# Patient Record
Sex: MALE | Race: BLACK OR AFRICAN AMERICAN | NOT HISPANIC OR LATINO | URBAN - NONMETROPOLITAN AREA
[De-identification: names, ages, dates, MRNs, and addresses within clinical notes are randomized per-mention and may not be internally consistent; named-entity substitution may affect disease eponyms.]

---

## 2023-08-10 ENCOUNTER — OFFICE VISIT (OUTPATIENT)
Dept: URBAN - NONMETROPOLITAN AREA CLINIC 4 | Facility: CLINIC | Age: 53
End: 2023-08-10

## 2023-11-03 ENCOUNTER — CLAIMS CREATED FROM THE CLAIM WINDOW (OUTPATIENT)
Dept: URBAN - NONMETROPOLITAN AREA CLINIC 4 | Facility: CLINIC | Age: 53
End: 2023-11-03
Payer: OTHER GOVERNMENT

## 2023-11-03 VITALS
SYSTOLIC BLOOD PRESSURE: 117 MMHG | WEIGHT: 186.6 LBS | HEIGHT: 69 IN | BODY MASS INDEX: 27.64 KG/M2 | DIASTOLIC BLOOD PRESSURE: 78 MMHG | TEMPERATURE: 97.9 F | HEART RATE: 65 BPM

## 2023-11-03 DIAGNOSIS — R14.0 BLOATING: ICD-10-CM

## 2023-11-03 DIAGNOSIS — Z12.11 COLON CANCER SCREENING: ICD-10-CM

## 2023-11-03 DIAGNOSIS — R11.0 NAUSEA: ICD-10-CM

## 2023-11-03 DIAGNOSIS — Z86.19 HISTORY OF HELICOBACTER PYLORI INFECTION: ICD-10-CM

## 2023-11-03 PROBLEM — 116289008: Status: ACTIVE | Noted: 2023-11-03

## 2023-11-03 PROBLEM — 422587007: Status: ACTIVE | Noted: 2023-11-03

## 2023-11-03 PROCEDURE — 99244 OFF/OP CNSLTJ NEW/EST MOD 40: CPT | Performed by: PHYSICIAN ASSISTANT

## 2023-11-03 PROCEDURE — 99204 OFFICE O/P NEW MOD 45 MIN: CPT | Performed by: PHYSICIAN ASSISTANT

## 2023-11-03 RX ORDER — PANTOPRAZOLE SODIUM 40 MG/1
1 TABLET TABLET, DELAYED RELEASE ORAL ONCE A DAY
Qty: 30 | OUTPATIENT
Start: 2023-11-03

## 2023-11-03 RX ORDER — ACETAMINOPHEN 325 MG
1 TABLET AS NEEDED TABLET ORAL
Status: ACTIVE | COMMUNITY

## 2023-11-03 NOTE — PHYSICAL EXAM HENT:
Head, normocephalic, atraumatic, Face, Face within normal limits, External ears within normal limits, External nose  normal appearance
No

## 2023-11-03 NOTE — HPI-TODAY'S VISIT:
Cornelio is a 53 year old male with past medical history of seasonal allergies who presents to the office today secondary to early satiety, nausea and intermittent abdominal bloating.  [facility] as well as colonoscopic screening in 2019. His colonoscopy wasn't normal he is due in 2029, and on his EGD although I do not have biopsies he was treated with amoxicillin, clarithromycin and omeprazole therapy for H pylori  he does endorse that he had a follow-up stool test to confirm eradication, however he is now with repeat presenting symptoms similar to the previous symptoms from 2019  he started taking probiotics with some improvement, but still has to stop eating mid meal because of bloating and nausea  he has not been taking any acid reflux medicine  does not smoke, does not drink and does not take excessive anti-inflammatory medications

## 2023-11-07 LAB — H PYLORI BREATH TEST: NOT DETECTED

## 2023-12-15 ENCOUNTER — OFFICE VISIT (OUTPATIENT)
Dept: URBAN - NONMETROPOLITAN AREA CLINIC 4 | Facility: CLINIC | Age: 53
End: 2023-12-15

## 2023-12-20 ENCOUNTER — CLAIMS CREATED FROM THE CLAIM WINDOW (OUTPATIENT)
Dept: URBAN - NONMETROPOLITAN AREA CLINIC 4 | Facility: CLINIC | Age: 53
End: 2023-12-20
Payer: OTHER GOVERNMENT

## 2023-12-20 ENCOUNTER — DASHBOARD ENCOUNTERS (OUTPATIENT)
Age: 53
End: 2023-12-20

## 2023-12-20 VITALS
HEART RATE: 68 BPM | HEIGHT: 69 IN | DIASTOLIC BLOOD PRESSURE: 81 MMHG | SYSTOLIC BLOOD PRESSURE: 129 MMHG | BODY MASS INDEX: 27.7 KG/M2 | WEIGHT: 187 LBS | TEMPERATURE: 97.9 F

## 2023-12-20 DIAGNOSIS — Z86.19 HISTORY OF HELICOBACTER PYLORI INFECTION: ICD-10-CM

## 2023-12-20 DIAGNOSIS — Z12.11 COLON CANCER SCREENING: ICD-10-CM

## 2023-12-20 PROBLEM — 15627741000119108: Status: ACTIVE | Noted: 2023-11-03

## 2023-12-20 PROBLEM — 305058001: Status: ACTIVE | Noted: 2023-11-03

## 2023-12-20 PROCEDURE — 99213 OFFICE O/P EST LOW 20 MIN: CPT | Performed by: PHYSICIAN ASSISTANT

## 2023-12-20 RX ORDER — ACETAMINOPHEN 325 MG
1 TABLET AS NEEDED TABLET ORAL
Status: ACTIVE | COMMUNITY

## 2023-12-20 RX ORDER — PANTOPRAZOLE SODIUM 40 MG/1
1 TABLET TABLET, DELAYED RELEASE ORAL ONCE A DAY
Qty: 30 | OUTPATIENT

## 2023-12-20 RX ORDER — PANTOPRAZOLE SODIUM 40 MG/1
1 TABLET TABLET, DELAYED RELEASE ORAL ONCE A DAY
Qty: 30 | Status: ACTIVE | COMMUNITY
Start: 2023-11-03

## 2023-12-20 NOTE — HPI-TODAY'S VISIT:
Cornelio is a 53 year old male with past medical history of seasonal allergies who presents to the office today secondary to early satiety, nausea and intermittent abdominal bloating.  [facility] in 2019 as well as colonoscopic screening in 2019. His colonoscopy wasn't normal he is due in 2029, and on his EGD although I do not have biopsies he was treated with amoxicillin, clarithromycin and omeprazole therapy for H pylori  he does endorse that he had a follow-up stool test to confirm eradication, however he is now with repeat presenting symptoms similar to the previous symptoms from 2019  he started taking probiotics with some improvement, but still has to stop eating mid meal because of bloating and nausea  he has not been taking any acid reflux medicine  does not smoke, does not drink and does not take excessive anti-inflammatory medications  12.20.23 doing well with no further bloating, HP testing negative  Taking PPI therapy as needed with no GI complaints